# Patient Record
Sex: FEMALE | URBAN - METROPOLITAN AREA
[De-identification: names, ages, dates, MRNs, and addresses within clinical notes are randomized per-mention and may not be internally consistent; named-entity substitution may affect disease eponyms.]

---

## 2021-12-14 NOTE — PATIENT DISCUSSION
Will hold off on dispensing GLRx today. Pt aware will need to return for refraction if plans to update glasses.

## 2022-05-17 ENCOUNTER — PREPPED CHART (OUTPATIENT)
Dept: URBAN - METROPOLITAN AREA CLINIC 50 | Facility: CLINIC | Age: 77
End: 2022-05-17

## 2022-06-15 ENCOUNTER — NEW PATIENT (OUTPATIENT)
Dept: URBAN - METROPOLITAN AREA CLINIC 50 | Facility: CLINIC | Age: 77
End: 2022-06-15

## 2022-06-15 DIAGNOSIS — H25.813: ICD-10-CM

## 2022-06-15 DIAGNOSIS — Z79.4: ICD-10-CM

## 2022-06-15 DIAGNOSIS — H16.223: ICD-10-CM

## 2022-06-15 DIAGNOSIS — E11.9: ICD-10-CM

## 2022-06-15 PROCEDURE — 92004 COMPRE OPH EXAM NEW PT 1/>: CPT

## 2022-06-15 ASSESSMENT — VISUAL ACUITY
OS_SC: 20/100
OS_GLARE: >20/400
OS_PH: 20/50
OD_CC: J3
OD_GLARE: >20/400
OD_SC: 20/400
OD_PH: 20/50-2
OS_CC: J5
OS_GLARE: >20/400
OD_GLARE: >20/400

## 2022-06-15 ASSESSMENT — KERATOMETRY
OS_AXISANGLE_DEGREES: 095
OD_K1POWER_DIOPTERS: 41.75
OS_AXISANGLE2_DEGREES: 5
OD_AXISANGLE2_DEGREES: 161
OD_AXISANGLE_DEGREES: 071
OS_K2POWER_DIOPTERS: 42.50
OS_K1POWER_DIOPTERS: 41.50
OD_K2POWER_DIOPTERS: 42.75

## 2022-06-15 ASSESSMENT — TONOMETRY
OD_IOP_MMHG: 12
OS_IOP_MMHG: 12

## 2022-07-20 ENCOUNTER — PRE-OP/H&P (OUTPATIENT)
Dept: URBAN - METROPOLITAN AREA CLINIC 50 | Facility: CLINIC | Age: 77
End: 2022-07-20

## 2022-07-20 DIAGNOSIS — H25.811: ICD-10-CM

## 2022-07-20 DIAGNOSIS — H25.812: ICD-10-CM

## 2022-07-20 PROCEDURE — 92025IOL CORNEAL TOPOGRAPHY PREMIUM IOL

## 2022-07-20 PROCEDURE — 92134 CPTRZ OPH DX IMG PST SGM RTA: CPT

## 2022-07-20 PROCEDURE — 92136 OPHTHALMIC BIOMETRY: CPT

## 2022-07-20 PROCEDURE — PREOP PRE OP VISIT

## 2022-07-20 ASSESSMENT — VISUAL ACUITY
OD_CC: 20/80
OS_CC: 20/60

## 2022-07-20 ASSESSMENT — KERATOMETRY
OD_AXISANGLE2_DEGREES: 062
OD_K2POWER_DIOPTERS: 40.87
OD_AXISANGLE_DEGREES: 152
OS_K1POWER_DIOPTERS: 42.37
OD_K1POWER_DIOPTERS: 42.37
OS_K2POWER_DIOPTERS: 41.00
OS_AXISANGLE_DEGREES: 005
OS_AXISANGLE2_DEGREES: 95

## 2022-07-20 ASSESSMENT — TONOMETRY
OD_IOP_MMHG: 13
OS_IOP_MMHG: 13

## 2022-07-20 NOTE — PATIENT DISCUSSION
Recommending toric lens superior package due to large amounts of astigmatism. If she can not do that we will do classic so that she gets some astigmatism correction.

## 2023-04-04 ENCOUNTER — POST-OP (OUTPATIENT)
Dept: URBAN - METROPOLITAN AREA CLINIC 48 | Facility: LOCATION | Age: 78
End: 2023-04-04

## 2023-04-04 ENCOUNTER — SURGERY/PROCEDURE (OUTPATIENT)
Dept: URBAN - METROPOLITAN AREA SURGERY 16 | Facility: SURGERY | Age: 78
End: 2023-04-04

## 2023-04-04 DIAGNOSIS — H25.812: ICD-10-CM

## 2023-04-04 DIAGNOSIS — Z98.42: ICD-10-CM

## 2023-04-04 DIAGNOSIS — Z96.1: ICD-10-CM

## 2023-04-04 PROCEDURE — 66984 XCAPSL CTRC RMVL W/O ECP: CPT

## 2023-04-04 ASSESSMENT — TONOMETRY: OS_IOP_MMHG: 16

## 2023-04-04 ASSESSMENT — KERATOMETRY
OD_K1POWER_DIOPTERS: 42.50
OS_K2POWER_DIOPTERS: 41.37
OS_AXISANGLE2_DEGREES: 90
OD_K1POWER_DIOPTERS: 42.50
OD_AXISANGLE2_DEGREES: 85
OD_AXISANGLE_DEGREES: 175
OD_AXISANGLE_DEGREES: 175
OS_AXISANGLE2_DEGREES: 90
OD_K2POWER_DIOPTERS: 41.25
OS_K1POWER_DIOPTERS: 42.25
OS_AXISANGLE_DEGREES: 180
OD_K2POWER_DIOPTERS: 41.25
OS_K2POWER_DIOPTERS: 41.37
OS_AXISANGLE_DEGREES: 180
OD_AXISANGLE2_DEGREES: 85
OS_K1POWER_DIOPTERS: 42.25

## 2023-04-04 ASSESSMENT — VISUAL ACUITY: OS_SC: 20/60

## 2023-04-19 ENCOUNTER — POST-OP (OUTPATIENT)
Dept: URBAN - METROPOLITAN AREA CLINIC 50 | Facility: CLINIC | Age: 78
End: 2023-04-19

## 2023-04-19 DIAGNOSIS — Z98.42: ICD-10-CM

## 2023-04-19 DIAGNOSIS — H25.811: ICD-10-CM

## 2023-04-19 DIAGNOSIS — Z96.1: ICD-10-CM

## 2023-04-19 PROCEDURE — 99024 POSTOP FOLLOW-UP VISIT: CPT

## 2023-04-19 ASSESSMENT — KERATOMETRY
OD_K1POWER_DIOPTERS: 41.00
OS_K2POWER_DIOPTERS: 42.25
OD_AXISANGLE2_DEGREES: 171
OS_AXISANGLE_DEGREES: 118
OS_K1POWER_DIOPTERS: 41.75
OD_K2POWER_DIOPTERS: 42.75
OS_AXISANGLE2_DEGREES: 28
OD_AXISANGLE_DEGREES: 081

## 2023-04-19 ASSESSMENT — VISUAL ACUITY
OD_SC: 20/100+1
OU_SC: 20/40+2
OS_SC: 20/40
OD_PH: 20/25

## 2023-04-19 ASSESSMENT — TONOMETRY
OD_IOP_MMHG: 11
OS_IOP_MMHG: 10

## 2023-05-03 ENCOUNTER — POST OP/EVAL OF SECOND EYE (OUTPATIENT)
Dept: URBAN - METROPOLITAN AREA CLINIC 50 | Facility: CLINIC | Age: 78
End: 2023-05-03

## 2023-05-03 DIAGNOSIS — H25.811: ICD-10-CM

## 2023-05-03 DIAGNOSIS — Z96.1: ICD-10-CM

## 2023-05-03 DIAGNOSIS — Z98.42: ICD-10-CM

## 2023-05-03 DIAGNOSIS — E11.9: ICD-10-CM

## 2023-05-03 PROCEDURE — 92136 - 2N OPHTHALMIC BIOMETRY BY PARTIAL COHERENCE INTERFEROMETRY WITH INTRAOCULAR LENS POWER CALCULATION

## 2023-05-03 PROCEDURE — 92134 CPTRZ OPH DX IMG PST SGM RTA: CPT

## 2023-05-03 PROCEDURE — 99213 OFFICE O/P EST LOW 20 MIN: CPT

## 2023-05-03 ASSESSMENT — KERATOMETRY
OS_K1POWER_DIOPTERS: 41.75
OD_AXISANGLE_DEGREES: 081
OD_K1POWER_DIOPTERS: 41.00
OS_AXISANGLE_DEGREES: 118
OS_K2POWER_DIOPTERS: 42.25
OS_AXISANGLE2_DEGREES: 28
OD_AXISANGLE2_DEGREES: 171
OD_K2POWER_DIOPTERS: 42.75

## 2023-05-03 ASSESSMENT — VISUAL ACUITY
OS_PH: 20/30
OD_SC: 20/100
OD_PH: 20/80-2
OS_SC: 20/40

## 2023-05-03 ASSESSMENT — TONOMETRY
OS_IOP_MMHG: 12
OD_IOP_MMHG: 12

## 2024-04-03 ENCOUNTER — COMPREHENSIVE EXAM (OUTPATIENT)
Dept: URBAN - METROPOLITAN AREA CLINIC 50 | Facility: LOCATION | Age: 79
End: 2024-04-03

## 2024-04-03 DIAGNOSIS — H25.811: ICD-10-CM

## 2024-04-03 DIAGNOSIS — E11.9: ICD-10-CM

## 2024-04-03 PROCEDURE — 99214 OFFICE O/P EST MOD 30 MIN: CPT

## 2024-04-03 PROCEDURE — 92134 CPTRZ OPH DX IMG PST SGM RTA: CPT

## 2024-04-03 PROCEDURE — 92015 DETERMINE REFRACTIVE STATE: CPT

## 2024-04-03 ASSESSMENT — KERATOMETRY
OS_K1POWER_DIOPTERS: 41.75
OS_AXISANGLE_DEGREES: 118
OD_AXISANGLE_DEGREES: 081
OS_K2POWER_DIOPTERS: 42.25
OD_K1POWER_DIOPTERS: 41.00
OD_AXISANGLE2_DEGREES: 171
OS_AXISANGLE2_DEGREES: 28
OD_K2POWER_DIOPTERS: 42.75

## 2024-04-03 ASSESSMENT — VISUAL ACUITY
OD_GLARE: 20/100
OS_GLARE: 20/40
OD_PH: 20/50+1
OD_GLARE: 20/400
OS_SC: 20/40-2
OU_CC: J1
OS_GLARE: 20/30
OD_SC: 20/200-1
OS_PH: 20/30

## 2024-04-03 ASSESSMENT — TONOMETRY
OD_IOP_MMHG: 12
OS_IOP_MMHG: 10

## 2024-05-15 ENCOUNTER — PRE-OP/H&P (OUTPATIENT)
Dept: URBAN - METROPOLITAN AREA CLINIC 50 | Facility: LOCATION | Age: 79
End: 2024-05-15

## 2024-05-15 DIAGNOSIS — H25.811: ICD-10-CM

## 2024-05-15 PROCEDURE — PREOP PRE OP VISIT

## 2024-05-15 PROCEDURE — 92136 OPHTHALMIC BIOMETRY: CPT

## 2024-05-15 ASSESSMENT — KERATOMETRY
OD_K1POWER_DIOPTERS: 42.87
OS_AXISANGLE2_DEGREES: 95
OS_AXISANGLE_DEGREES: 05
OS_K2POWER_DIOPTERS: 41.25
OD_K2POWER_DIOPTERS: 41.25
OD_AXISANGLE2_DEGREES: 80
OS_K1POWER_DIOPTERS: 42.50
OD_AXISANGLE_DEGREES: 170

## 2024-05-15 ASSESSMENT — VISUAL ACUITY
OD_PH: 20/50
OS_PH: 20/30+2
OD_SC: 20/80
OS_SC: 20/40-2

## 2024-05-15 ASSESSMENT — TONOMETRY
OS_IOP_MMHG: 15
OD_IOP_MMHG: 16

## 2024-06-18 ENCOUNTER — SURGERY/PROCEDURE (OUTPATIENT)
Dept: URBAN - METROPOLITAN AREA SURGERY 16 | Facility: SURGERY | Age: 79
End: 2024-06-18

## 2024-06-18 ENCOUNTER — POST-OP (OUTPATIENT)
Dept: URBAN - METROPOLITAN AREA CLINIC 48 | Facility: LOCATION | Age: 79
End: 2024-06-18

## 2024-06-18 DIAGNOSIS — H57.11: ICD-10-CM

## 2024-06-18 DIAGNOSIS — Z96.1: ICD-10-CM

## 2024-06-18 DIAGNOSIS — Z98.41: ICD-10-CM

## 2024-06-18 DIAGNOSIS — H25.11: ICD-10-CM

## 2024-06-18 PROCEDURE — 68841 INSJ RX ELUT IMPLT LAC CANAL: CPT | Mod: 51,RT

## 2024-06-18 PROCEDURE — 66984 XCAPSL CTRC RMVL W/O ECP: CPT

## 2024-06-18 ASSESSMENT — VISUAL ACUITY: OD_SC: 20/70-1

## 2024-06-18 ASSESSMENT — KERATOMETRY
OS_K1POWER_DIOPTERS: 42.50
OS_AXISANGLE2_DEGREES: 95
OD_K1POWER_DIOPTERS: 42.87
OD_K2POWER_DIOPTERS: 41.25
OS_AXISANGLE_DEGREES: 05
OS_K1POWER_DIOPTERS: 42.50
OS_AXISANGLE_DEGREES: 05
OS_K2POWER_DIOPTERS: 41.25
OD_K2POWER_DIOPTERS: 41.25
OD_AXISANGLE_DEGREES: 170
OD_AXISANGLE2_DEGREES: 80
OD_K1POWER_DIOPTERS: 42.87
OD_AXISANGLE2_DEGREES: 80
OD_AXISANGLE_DEGREES: 170
OS_AXISANGLE2_DEGREES: 95
OS_K2POWER_DIOPTERS: 41.25

## 2024-06-18 ASSESSMENT — TONOMETRY: OD_IOP_MMHG: 14

## 2024-06-26 ENCOUNTER — POST-OP (OUTPATIENT)
Dept: URBAN - METROPOLITAN AREA CLINIC 50 | Facility: LOCATION | Age: 79
End: 2024-06-26

## 2024-06-26 DIAGNOSIS — Z96.1: ICD-10-CM

## 2024-06-26 DIAGNOSIS — Z98.41: ICD-10-CM

## 2024-06-26 ASSESSMENT — KERATOMETRY
OS_K1POWER_DIOPTERS: 42.50
OD_AXISANGLE_DEGREES: 170
OD_K2POWER_DIOPTERS: 41.25
OS_K2POWER_DIOPTERS: 41.25
OS_AXISANGLE2_DEGREES: 95
OD_K1POWER_DIOPTERS: 42.87
OD_AXISANGLE2_DEGREES: 80
OS_AXISANGLE_DEGREES: 05

## 2024-06-26 ASSESSMENT — VISUAL ACUITY
OD_SC: 20/50-1
OS_SC: 20/50+1

## 2024-06-26 ASSESSMENT — TONOMETRY
OD_IOP_MMHG: 09
OS_IOP_MMHG: 08

## 2024-07-17 ENCOUNTER — POST-OP (OUTPATIENT)
Dept: URBAN - METROPOLITAN AREA CLINIC 50 | Facility: LOCATION | Age: 79
End: 2024-07-17

## 2024-07-17 DIAGNOSIS — E11.9: ICD-10-CM

## 2024-07-17 DIAGNOSIS — H16.223: ICD-10-CM

## 2024-07-17 DIAGNOSIS — Z98.41: ICD-10-CM

## 2024-07-17 DIAGNOSIS — Z96.1: ICD-10-CM

## 2024-07-17 PROCEDURE — 92015GRNC REFRACTION GLASSES RECHECK NO CHARGE

## 2024-07-17 ASSESSMENT — KERATOMETRY
OD_AXISANGLE2_DEGREES: 167
OS_K1POWER_DIOPTERS: 41.75
OD_K2POWER_DIOPTERS: 42.50
OS_AXISANGLE2_DEGREES: 176
OD_K1POWER_DIOPTERS: 41.00
OS_AXISANGLE_DEGREES: 086
OS_K2POWER_DIOPTERS: 42.75
OD_AXISANGLE_DEGREES: 77

## 2024-07-17 ASSESSMENT — TONOMETRY
OS_IOP_MMHG: 12
OD_IOP_MMHG: 12

## 2024-07-17 ASSESSMENT — VISUAL ACUITY
OS_SC: 20/30
OD_SC: 20/25
OU_SC: 20/30

## 2024-07-31 ENCOUNTER — EMERGENCY VISIT (OUTPATIENT)
Dept: URBAN - METROPOLITAN AREA CLINIC 50 | Facility: LOCATION | Age: 79
End: 2024-07-31

## 2024-07-31 DIAGNOSIS — H20.011: ICD-10-CM

## 2024-07-31 PROCEDURE — 99213 OFFICE O/P EST LOW 20 MIN: CPT

## 2024-07-31 RX ORDER — CYCLOPENTOLATE HYDROCHLORIDE 10 MG/ML
1 SOLUTION OPHTHALMIC TWICE A DAY
Start: 2024-07-31

## 2024-07-31 RX ORDER — PREDNISOLONE ACETATE 10 MG/ML
1 SUSPENSION/ DROPS OPHTHALMIC
Start: 2024-07-31

## 2024-07-31 ASSESSMENT — TONOMETRY
OD_IOP_MMHG: 11
OS_IOP_MMHG: 12

## 2024-07-31 ASSESSMENT — VISUAL ACUITY
OS_SC: 20/40
OS_PH: 20/30
OD_SC: 20/40

## 2024-08-05 ENCOUNTER — FOLLOW UP (OUTPATIENT)
Dept: URBAN - METROPOLITAN AREA CLINIC 24 | Facility: CLINIC | Age: 79
End: 2024-08-05

## 2024-08-05 DIAGNOSIS — H26.491: ICD-10-CM

## 2024-08-05 DIAGNOSIS — H16.223: ICD-10-CM

## 2024-08-05 PROCEDURE — 99213 OFFICE O/P EST LOW 20 MIN: CPT

## 2024-08-05 ASSESSMENT — TONOMETRY
OD_IOP_MMHG: 11
OS_IOP_MMHG: 09

## 2024-08-05 ASSESSMENT — VISUAL ACUITY
OD_SC: 20/40-1
OS_SC: 20/50+1

## 2024-08-07 ENCOUNTER — CONSULTATION/EVALUATION (OUTPATIENT)
Dept: URBAN - METROPOLITAN AREA CLINIC 50 | Facility: LOCATION | Age: 79
End: 2024-08-07

## 2024-08-07 DIAGNOSIS — H26.491: ICD-10-CM

## 2024-08-07 PROCEDURE — 66821 AFTER CATARACT LASER SURGERY: CPT | Mod: 25,RT

## 2024-08-07 PROCEDURE — 99214 OFFICE O/P EST MOD 30 MIN: CPT

## 2024-08-07 RX ORDER — PREDNISOLONE ACETATE 10 MG/ML
1 SUSPENSION/ DROPS OPHTHALMIC
Start: 2024-08-07

## 2024-08-07 ASSESSMENT — TONOMETRY
OS_IOP_MMHG: 14
OD_IOP_MMHG: 13

## 2024-08-07 ASSESSMENT — VISUAL ACUITY
OD_PH: 20/30
OS_PH: 20/30
OD_SC: 20/40-2
OD_GLARE: 20/60
OD_GLARE: 20/50
OS_SC: 20/50

## 2024-08-21 ENCOUNTER — POST-OP (OUTPATIENT)
Dept: URBAN - METROPOLITAN AREA CLINIC 50 | Facility: LOCATION | Age: 79
End: 2024-08-21

## 2024-08-21 DIAGNOSIS — Z98.890: ICD-10-CM

## 2024-08-21 ASSESSMENT — TONOMETRY
OD_IOP_MMHG: 12
OS_IOP_MMHG: 12

## 2024-08-21 ASSESSMENT — VISUAL ACUITY
OU_SC: J3
OS_SC: 20/25-1
OD_SC: 20/25
OU_SC: 20/20

## 2024-10-02 ENCOUNTER — FOLLOW UP (OUTPATIENT)
Dept: URBAN - METROPOLITAN AREA CLINIC 50 | Facility: LOCATION | Age: 79
End: 2024-10-02

## 2024-10-02 DIAGNOSIS — H52.4: ICD-10-CM

## 2024-10-02 DIAGNOSIS — H20.011: ICD-10-CM

## 2024-10-02 PROCEDURE — 92015 DETERMINE REFRACTIVE STATE: CPT

## 2024-10-02 PROCEDURE — 99213 OFFICE O/P EST LOW 20 MIN: CPT

## 2024-10-02 RX ORDER — PREDNISOLONE ACETATE 10 MG/ML
1 SUSPENSION/ DROPS OPHTHALMIC
Start: 2024-10-02

## 2025-06-26 ENCOUNTER — COMPREHENSIVE EXAM (OUTPATIENT)
Age: 80
End: 2025-06-26

## 2025-06-26 DIAGNOSIS — H43.811: ICD-10-CM

## 2025-06-26 DIAGNOSIS — H35.371: ICD-10-CM

## 2025-06-26 DIAGNOSIS — H16.223: ICD-10-CM

## 2025-06-26 DIAGNOSIS — E11.9: ICD-10-CM

## 2025-06-26 PROCEDURE — 99214 OFFICE O/P EST MOD 30 MIN: CPT
